# Patient Record
Sex: MALE | Race: WHITE | NOT HISPANIC OR LATINO | URBAN - METROPOLITAN AREA
[De-identification: names, ages, dates, MRNs, and addresses within clinical notes are randomized per-mention and may not be internally consistent; named-entity substitution may affect disease eponyms.]

---

## 2022-02-02 ENCOUNTER — OFFICE VISIT (OUTPATIENT)
Dept: PODIATRY | Facility: CLINIC | Age: 77
End: 2022-02-02
Payer: MEDICARE

## 2022-02-02 VITALS — WEIGHT: 160 LBS | BODY MASS INDEX: 25.11 KG/M2 | HEIGHT: 67 IN | RESPIRATION RATE: 16 BRPM

## 2022-02-02 DIAGNOSIS — M21.622 TAILOR'S BUNIONETTE, LEFT: ICD-10-CM

## 2022-02-02 DIAGNOSIS — B35.1 ONYCHOMYCOSIS: ICD-10-CM

## 2022-02-02 DIAGNOSIS — M89.8X9 BONY EXOSTOSIS: ICD-10-CM

## 2022-02-02 DIAGNOSIS — M21.962 ACQUIRED DEFORMITY OF LEFT FOOT: Primary | ICD-10-CM

## 2022-02-02 DIAGNOSIS — M20.62 ACQUIRED DEFORMITY OF LEFT TOE: ICD-10-CM

## 2022-02-02 PROCEDURE — 73620 X-RAY EXAM OF FOOT: CPT | Performed by: PODIATRIST

## 2022-02-02 PROCEDURE — 29550 STRAPPING OF TOES: CPT | Performed by: PODIATRIST

## 2022-02-02 PROCEDURE — 99203 OFFICE O/P NEW LOW 30 MIN: CPT | Performed by: PODIATRIST

## 2022-02-02 RX ORDER — ASCORBATE CALCIUM 500 MG
500 TABLET ORAL DAILY
COMMUNITY

## 2022-02-02 RX ORDER — VALSARTAN 80 MG/1
80 TABLET ORAL DAILY
COMMUNITY

## 2022-02-02 RX ORDER — WARFARIN SODIUM 5 MG/1
TABLET ORAL
COMMUNITY

## 2022-02-02 RX ORDER — MONTELUKAST SODIUM 10 MG/1
10 TABLET ORAL EVERY OTHER DAY
COMMUNITY
Start: 2021-11-15

## 2022-02-02 RX ORDER — FUROSEMIDE 80 MG
80 TABLET ORAL 2 TIMES DAILY
COMMUNITY

## 2022-02-02 RX ORDER — POTASSIUM CHLORIDE 20 MEQ/1
20 TABLET, EXTENDED RELEASE ORAL 2 TIMES DAILY
COMMUNITY
Start: 2022-01-11

## 2022-02-02 RX ORDER — SENNOSIDES 8.6 MG
650 CAPSULE ORAL EVERY 8 HOURS PRN
COMMUNITY

## 2022-02-02 RX ORDER — OMEGA-3S/DHA/EPA/FISH OIL/D3 300MG-1000
400 CAPSULE ORAL DAILY
COMMUNITY

## 2022-02-02 RX ORDER — SIMVASTATIN 40 MG
40 TABLET ORAL
COMMUNITY

## 2022-02-02 NOTE — PROGRESS NOTES
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  Enlarged tailor's bunion  Hammertoe 5th left  Secondary corn formation over 5th MPJ  Mycosis of nail    Plan  Foot exam performed  Patient educated on condition  Lesion debrided  Toe splinted in rectus position  Patient may need ostectomy  In addition all nails debrided without pain or complication         Diagnoses and all orders for this visit:    Acquired deformity of left foot    Bony exostosis    Tailor's bunionette, left    Acquired deformity of left toe    Onychomycosis    Other orders  -     potassium chloride (K-DUR,KLOR-CON) 20 mEq tablet; Take 20 mEq by mouth 2 (two) times a day  -     montelukast (SINGULAIR) 10 mg tablet; Take 10 mg by mouth every other day  -     simvastatin (ZOCOR) 40 mg tablet; Take 40 mg by mouth daily at bedtime  -     valsartan (DIOVAN) 80 mg tablet; Take 80 mg by mouth daily  -     furosemide (LASIX) 80 mg tablet; Take 80 mg by mouth 2 (two) times a day  -     warfarin (COUMADIN) 5 mg tablet; Take by mouth daily  -     cholecalciferol (VITAMIN D3) 400 units tablet; Take 400 Units by mouth daily  -     Calcium Ascorbate (VITAMIN C) 500 mg tablet; Take 500 mg by mouth daily  -     acetaminophen (TYLENOL) 650 mg CR tablet; Take 650 mg by mouth every 8 (eight) hours as needed for mild pain          Subjective:      Allergies   Allergen Reactions    Penicillins Rash         Current Outpatient Medications:     acetaminophen (TYLENOL) 650 mg CR tablet, Take 650 mg by mouth every 8 (eight) hours as needed for mild pain, Disp: , Rfl:     Calcium Ascorbate (VITAMIN C) 500 mg tablet, Take 500 mg by mouth daily, Disp: , Rfl:     cholecalciferol (VITAMIN D3) 400 units tablet, Take 400 Units by mouth daily, Disp: , Rfl:     furosemide (LASIX) 80 mg tablet, Take 80 mg by mouth 2 (two) times a day, Disp: , Rfl:     simvastatin (ZOCOR) 40 mg tablet, Take 40 mg by mouth daily at bedtime, Disp: , Rfl:     valsartan (DIOVAN) 80 mg tablet, Take 80 mg by mouth daily, Disp: , Rfl:     warfarin (COUMADIN) 5 mg tablet, Take by mouth daily, Disp: , Rfl:     montelukast (SINGULAIR) 10 mg tablet, Take 10 mg by mouth every other day, Disp: , Rfl:     potassium chloride (K-DUR,KLOR-CON) 20 mEq tablet, Take 20 mEq by mouth 2 (two) times a day, Disp: , Rfl:     There is no problem list on file for this patient  Patient ID: Kerry Alvarez is a 68 y o  male  HPI    The following portions of the patient's history were reviewed and updated as appropriate:     family history is not on file  reports that he has quit smoking  He has never used smokeless tobacco  No history on file for alcohol use and drug use  Vitals:    02/02/22 1437   Resp: 16       Review of Systems      Objective:  Patient's shoes and socks removed  Foot Exam    General  General Appearance: appears stated age and healthy   Orientation: alert and oriented to person, place, and time   Affect: appropriate   Gait: antalgic       Right Foot/Ankle     Inspection and Palpation  Ecchymosis: none  Swelling: dorsum   Arch: pes planus  Hammertoes: fifth toe  Hallux limitus: yes  Skin Exam: callus and dry skin;     Neurovascular  Dorsalis pedis: 2+  Posterior tibial: 2+      Left Foot/Ankle      Inspection and Palpation  Ecchymosis: none  Tenderness: bony tenderness, lesser metatarsophalangeal joints and metatarsals   Swelling: dorsum   Arch: pes planus  Hammertoes: fifth toe and second toe  Hallux limitus: yes  Skin Exam: callus and dry skin;     Neurovascular  Dorsalis pedis: 2+  Posterior tibial: 2+        Physical Exam  Vitals and nursing note reviewed  Constitutional:       Appearance: Normal appearance  Cardiovascular:      Rate and Rhythm: Normal rate and regular rhythm  Pulses:           Dorsalis pedis pulses are 2+ on the right side and 2+ on the left side          Posterior tibial pulses are 2+ on the right side and 2+ on the left side  Musculoskeletal:      Left foot: Bony tenderness present  Comments: 5th MPJ left foot is subluxed  Toe is in a adducted 0 varus position  X-ray notes subluxation of 5th left MPJ  There is some cystic erosion on lateral aspect 5th metatarsal head  This would be consistent with gouty arthritis or tophaceous gout buildup  Feet:      Right foot:      Skin integrity: Callus and dry skin present  Left foot:      Skin integrity: Callus and dry skin present  Skin:     Capillary Refill: Capillary refill takes less than 2 seconds  Comments: Large corn noted over lateral aspect 5th MPJ  All nails are dystrophic  They demonstrate mycosis  Neurological:      Mental Status: He is alert  Psychiatric:         Mood and Affect: Mood normal          Behavior: Behavior normal          Thought Content:  Thought content normal          Judgment: Judgment normal

## 2022-03-28 ENCOUNTER — OFFICE VISIT (OUTPATIENT)
Dept: PODIATRY | Facility: CLINIC | Age: 77
End: 2022-03-28
Payer: MEDICARE

## 2022-03-28 VITALS — BODY MASS INDEX: 25.11 KG/M2 | WEIGHT: 160 LBS | HEIGHT: 67 IN | RESPIRATION RATE: 16 BRPM

## 2022-03-28 DIAGNOSIS — M89.8X9 BONY EXOSTOSIS: ICD-10-CM

## 2022-03-28 DIAGNOSIS — M21.962 ACQUIRED DEFORMITY OF LEFT FOOT: Primary | ICD-10-CM

## 2022-03-28 DIAGNOSIS — M20.62 ACQUIRED DEFORMITY OF LEFT TOE: ICD-10-CM

## 2022-03-28 DIAGNOSIS — M21.622 TAILOR'S BUNIONETTE, LEFT: ICD-10-CM

## 2022-03-28 DIAGNOSIS — B35.1 ONYCHOMYCOSIS: ICD-10-CM

## 2022-03-28 PROCEDURE — 99212 OFFICE O/P EST SF 10 MIN: CPT | Performed by: PODIATRIST

## 2022-03-28 NOTE — PROGRESS NOTES
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  Enlarged tailor's bunion  Hammertoe 5th left  Secondary corn formation over 5th MPJ  Mycosis of nail     Plan  Foot exam performed  Patient educated on condition  Lesion debrided  Toe splinted in rectus position  Patient may need ostectomy  In addition all nails debrided without pain or complication            Diagnoses and all orders for this visit:     Acquired deformity of left foot     Bony exostosis     Tailor's bunionette, left     Acquired deformity of left toe     Onychomycosis     Other orders  -     potassium chloride (K-DUR,KLOR-CON) 20 mEq tablet; Take 20 mEq by mouth 2 (two) times a day  -     montelukast (SINGULAIR) 10 mg tablet; Take 10 mg by mouth every other day  -     simvastatin (ZOCOR) 40 mg tablet; Take 40 mg by mouth daily at bedtime  -     valsartan (DIOVAN) 80 mg tablet; Take 80 mg by mouth daily  -     furosemide (LASIX) 80 mg tablet; Take 80 mg by mouth 2 (two) times a day  -     warfarin (COUMADIN) 5 mg tablet; Take by mouth daily  -     cholecalciferol (VITAMIN D3) 400 units tablet; Take 400 Units by mouth daily  -     Calcium Ascorbate (VITAMIN C) 500 mg tablet; Take 500 mg by mouth daily  -     acetaminophen (TYLENOL) 650 mg CR tablet;  Take 650 mg by mouth every 8 (eight) hours as needed for mild pain            Subjective:           Allergies   Allergen Reactions    Penicillins Rash            Current Outpatient Medications:     acetaminophen (TYLENOL) 650 mg CR tablet, Take 650 mg by mouth every 8 (eight) hours as needed for mild pain, Disp: , Rfl:     Calcium Ascorbate (VITAMIN C) 500 mg tablet, Take 500 mg by mouth daily, Disp: , Rfl:     cholecalciferol (VITAMIN D3) 400 units tablet, Take 400 Units by mouth daily, Disp: , Rfl:     furosemide (LASIX) 80 mg tablet, Take 80 mg by mouth 2 (two) times a day, Disp: , Rfl:     simvastatin (ZOCOR) 40 mg tablet, Take 40 mg by mouth daily at bedtime, Disp: , Rfl:     valsartan (DIOVAN) 80 mg tablet, Take 80 mg by mouth daily, Disp: , Rfl:     warfarin (COUMADIN) 5 mg tablet, Take by mouth daily, Disp: , Rfl:     montelukast (SINGULAIR) 10 mg tablet, Take 10 mg by mouth every other day, Disp: , Rfl:     potassium chloride (K-DUR,KLOR-CON) 20 mEq tablet, Take 20 mEq by mouth 2 (two) times a day, Disp: , Rfl:      There is no problem list on file for this patient             Patient ID: Kathi Britton is a 68 y o  male      HPI     The following portions of the patient's history were reviewed and updated as appropriate:      family history is not on file        reports that he has quit smoking  He has never used smokeless tobacco  No history on file for alcohol use and drug use          Vitals:     02/02/22 1437   Resp: 16         Review of Systems       Objective:  Patient's shoes and socks removed  Foot Exam     General  General Appearance: appears stated age and healthy   Orientation: alert and oriented to person, place, and time   Affect: appropriate   Gait: antalgic         Right Foot/Ankle      Inspection and Palpation  Ecchymosis: none  Swelling: dorsum   Arch: pes planus  Hammertoes: fifth toe  Hallux limitus: yes  Skin Exam: callus and dry skin;      Neurovascular  Dorsalis pedis: 2+  Posterior tibial: 2+        Left Foot/Ankle       Inspection and Palpation  Ecchymosis: none  Tenderness: bony tenderness, lesser metatarsophalangeal joints and metatarsals   Swelling: dorsum   Arch: pes planus  Hammertoes: fifth toe and second toe  Hallux limitus: yes  Skin Exam: callus and dry skin;      Neurovascular  Dorsalis pedis: 2+  Posterior tibial: 2+           Physical Exam  Vitals and nursing note reviewed  Constitutional:       Appearance: Normal appearance  Cardiovascular:      Rate and Rhythm: Normal rate and regular rhythm  Pulses:           Dorsalis pedis pulses are 2+ on the right side and 2+ on the left side  Posterior tibial pulses are 2+ on the right side and 2+ on the left side  Musculoskeletal:      Left foot: Bony tenderness present  Comments: 5th MPJ left foot is subluxed  Toe is in a adducted 0 varus position  X-ray notes subluxation of 5th left MPJ  There is some cystic erosion on lateral aspect 5th metatarsal head  This would be consistent with gouty arthritis or tophaceous gout buildup  Feet:      Right foot:      Skin integrity: Callus and dry skin present  Left foot:      Skin integrity: Callus and dry skin present  Skin:     Capillary Refill: Capillary refill takes less than 2 seconds  Comments: Large corn noted over lateral aspect 5th MPJ  All nails are dystrophic  They demonstrate mycosis  Neurological:      Mental Status: He is alert  Psychiatric:         Mood and Affect: Mood normal          Behavior: Behavior normal          Thought Content:  Thought content normal          Judgment: Judgment normal

## 2022-05-27 ENCOUNTER — OFFICE VISIT (OUTPATIENT)
Dept: PODIATRY | Facility: CLINIC | Age: 77
End: 2022-05-27
Payer: MEDICARE

## 2022-05-27 VITALS — HEIGHT: 67 IN | BODY MASS INDEX: 25.11 KG/M2 | WEIGHT: 160 LBS | RESPIRATION RATE: 17 BRPM

## 2022-05-27 DIAGNOSIS — M21.622 TAILOR'S BUNIONETTE, LEFT: ICD-10-CM

## 2022-05-27 DIAGNOSIS — B35.1 ONYCHOMYCOSIS: ICD-10-CM

## 2022-05-27 DIAGNOSIS — M21.962 ACQUIRED DEFORMITY OF LEFT FOOT: ICD-10-CM

## 2022-05-27 DIAGNOSIS — M20.62 ACQUIRED DEFORMITY OF LEFT TOE: Primary | ICD-10-CM

## 2022-05-27 DIAGNOSIS — M89.8X9 BONY EXOSTOSIS: ICD-10-CM

## 2022-05-27 PROCEDURE — 99213 OFFICE O/P EST LOW 20 MIN: CPT | Performed by: PODIATRIST

## 2022-05-27 NOTE — PROGRESS NOTES
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   Enlarged tailor's bunion   Hammertoe 5th left   Secondary corn formation over 5th MPJ   Mycosis of nail     Plan   Foot exam performed   Patient educated on condition   Lesion debrided   Toe splinted in rectus position   Patient may need ostectomy   In addition all nails debrided without pain or complication            Diagnoses and all orders for this visit:     Acquired deformity of left foot     Bony exostosis     Tailor's bunionette, left     Acquired deformity of left toe     Onychomycosis          Subjective:  Patient complains of pain  He has pain in his left foot  His pain upon ambulation with shoe wear  No history of trauma             Allergies   Allergen Reactions    Penicillins Rash            Current Outpatient Medications:     acetaminophen (TYLENOL) 650 mg CR tablet, Take 650 mg by mouth every 8 (eight) hours as needed for mild pain, Disp: , Rfl:     Calcium Ascorbate (VITAMIN C) 500 mg tablet, Take 500 mg by mouth daily, Disp: , Rfl:     cholecalciferol (VITAMIN D3) 400 units tablet, Take 400 Units by mouth daily, Disp: , Rfl:     furosemide (LASIX) 80 mg tablet, Take 80 mg by mouth 2 (two) times a day, Disp: , Rfl:     simvastatin (ZOCOR) 40 mg tablet, Take 40 mg by mouth daily at bedtime, Disp: , Rfl:     valsartan (DIOVAN) 80 mg tablet, Take 80 mg by mouth daily, Disp: , Rfl:     warfarin (COUMADIN) 5 mg tablet, Take by mouth daily, Disp: , Rfl:     montelukast (SINGULAIR) 10 mg tablet, Take 10 mg by mouth every other day, Disp: , Rfl:     potassium chloride (K-DUR,KLOR-CON) 20 mEq tablet, Take 20 mEq by mouth 2 (two) times a day, Disp: , Rfl:      There is no problem list on file for this patient             Patient ID: Augusto Garcia is a 68 y  o  male      HPI     The following portions of the patient's history were reviewed and updated as appropriate:      family history is not on file        reports that he has quit smoking  He has never used smokeless tobacco  No history on file for alcohol use and drug use         Objective:  Patient's shoes and socks removed    Foot Exam     General  General Appearance: appears stated age and healthy   Orientation: alert and oriented to person, place, and time   Affect: appropriate   Gait: antalgic         Right Foot/Ankle      Inspection and Palpation  Ecchymosis: none  Swelling: dorsum   Arch: pes planus  Hammertoes: fifth toe  Hallux limitus: yes  Skin Exam: callus and dry skin;      Neurovascular  Dorsalis pedis: 2+  Posterior tibial: 2+        Left Foot/Ankle       Inspection and Palpation  Ecchymosis: none  Tenderness: bony tenderness, lesser metatarsophalangeal joints and metatarsals   Swelling: dorsum   Arch: pes planus  Hammertoes: fifth toe and second toe  Hallux limitus: yes  Skin Exam: callus and dry skin;      Neurovascular  Dorsalis pedis: 2+  Posterior tibial: 2+           Physical Exam  Vitals and nursing note reviewed  Constitutional:       Appearance: Normal appearance  Cardiovascular:      Rate and Rhythm: Normal rate and regular rhythm       Pulses:           Dorsalis pedis pulses are 2+ on the right side and 2+ on the left side         Posterior tibial pulses are 2+ on the right side and 2+ on the left side  Musculoskeletal:      Left foot: Bony tenderness present       Comments: 5th MPJ left foot is subluxed   Toe is in a adducted 0 varus position   X-ray notes subluxation of 5th left MPJ   There is some cystic erosion on lateral aspect 5th metatarsal head   This would be consistent with gouty arthritis or tophaceous gout buildup    Feet:      Right foot:      Skin integrity: Callus and dry skin present       Left foot:      Skin integrity: Callus and dry skin present  Skin:     Capillary Refill: Capillary refill takes less than 2 seconds       Comments: Large corn noted over lateral aspect 5th MPJ      All nails are dystrophic  Guillermo Ramus demonstrate mycosis    Neurological:      Mental Status: He is alert  Psychiatric:         Mood and Affect: Mood normal          BehaviorEd Hicks         Thought Content:  Thought content normal          Judgment: Judgment normal

## 2022-08-01 ENCOUNTER — OFFICE VISIT (OUTPATIENT)
Dept: PODIATRY | Facility: CLINIC | Age: 77
End: 2022-08-01
Payer: MEDICARE

## 2022-08-01 VITALS — WEIGHT: 160 LBS | BODY MASS INDEX: 25.11 KG/M2 | HEIGHT: 67 IN | RESPIRATION RATE: 17 BRPM

## 2022-08-01 DIAGNOSIS — M89.8X9 BONY EXOSTOSIS: ICD-10-CM

## 2022-08-01 DIAGNOSIS — B35.1 ONYCHOMYCOSIS: ICD-10-CM

## 2022-08-01 DIAGNOSIS — M21.622 TAILOR'S BUNIONETTE, LEFT: ICD-10-CM

## 2022-08-01 DIAGNOSIS — M21.962 ACQUIRED DEFORMITY OF LEFT FOOT: ICD-10-CM

## 2022-08-01 DIAGNOSIS — M20.62 ACQUIRED DEFORMITY OF LEFT TOE: Primary | ICD-10-CM

## 2022-08-01 PROCEDURE — 99213 OFFICE O/P EST LOW 20 MIN: CPT | Performed by: PODIATRIST

## 2022-08-01 NOTE — PROGRESS NOTES
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   Enlarged tailor's bunion   Hammertoe 5th left   Secondary corn formation over 5th MPJ   Mycosis of nail     Plan   Foot exam performed   Patient educated on condition   Lesion debrided   Toe splinted in rectus position   Patient may need ostectomy   In addition all nails debrided without pain or complication            Diagnoses and all orders for this visit:     Acquired deformity of left foot     Bony exostosis     Tailor's bunionette, left     Acquired deformity of left toe     Onychomycosis          Subjective:  Patient complains of pain  He has pain in his left foot  His pain upon ambulation with shoe wear  No history of trauma             Allergies   Allergen Reactions    Penicillins Rash            Current Outpatient Medications:     acetaminophen (TYLENOL) 650 mg CR tablet, Take 650 mg by mouth every 8 (eight) hours as needed for mild pain, Disp: , Rfl:     Calcium Ascorbate (VITAMIN C) 500 mg tablet, Take 500 mg by mouth daily, Disp: , Rfl:     cholecalciferol (VITAMIN D3) 400 units tablet, Take 400 Units by mouth daily, Disp: , Rfl:     furosemide (LASIX) 80 mg tablet, Take 80 mg by mouth 2 (two) times a day, Disp: , Rfl:     simvastatin (ZOCOR) 40 mg tablet, Take 40 mg by mouth daily at bedtime, Disp: , Rfl:     valsartan (DIOVAN) 80 mg tablet, Take 80 mg by mouth daily, Disp: , Rfl:     warfarin (COUMADIN) 5 mg tablet, Take by mouth daily, Disp: , Rfl:     montelukast (SINGULAIR) 10 mg tablet, Take 10 mg by mouth every other day, Disp: , Rfl:     potassium chloride (K-DUR,KLOR-CON) 20 mEq tablet, Take 20 mEq by mouth 2 (two) times a day, Disp: , Rfl:      There is no problem list on file for this patient             Patient ID: Augusto Garcia is a 68 y  o  male      HPI     The following portions of the patient's history were reviewed and updated as appropriate:      family history is not on file        reports that he has quit smoking  He has never used smokeless tobacco  No history on file for alcohol use and drug use         Objective:  Patient's shoes and socks removed    Foot Exam     General  General Appearance: appears stated age and healthy   Orientation: alert and oriented to person, place, and time   Affect: appropriate   Gait: antalgic         Right Foot/Ankle      Inspection and Palpation  Ecchymosis: none  Swelling: dorsum   Arch: pes planus  Hammertoes: fifth toe  Hallux limitus: yes  Skin Exam: callus and dry skin;      Neurovascular  Dorsalis pedis: 2+  Posterior tibial: 2+        Left Foot/Ankle       Inspection and Palpation  Ecchymosis: none  Tenderness: bony tenderness, lesser metatarsophalangeal joints and metatarsals   Swelling: dorsum   Arch: pes planus  Hammertoes: fifth toe and second toe  Hallux limitus: yes  Skin Exam: callus and dry skin;      Neurovascular  Dorsalis pedis: 2+  Posterior tibial: 2+           Physical Exam  Vitals and nursing note reviewed  Constitutional:       Appearance: Normal appearance  Cardiovascular:      Rate and Rhythm: Normal rate and regular rhythm       Pulses:           Dorsalis pedis pulses are 2+ on the right side and 2+ on the left side         Posterior tibial pulses are 2+ on the right side and 2+ on the left side  Musculoskeletal:      Left foot: Bony tenderness present       Comments: 5th MPJ left foot is subluxed   Toe is in a adducted 0 varus position   X-ray notes subluxation of 5th left MPJ   There is some cystic erosion on lateral aspect 5th metatarsal head   This would be consistent with gouty arthritis or tophaceous gout buildup    Feet:      Right foot:      Skin integrity: Callus and dry skin present       Left foot:      Skin integrity: Callus and dry skin present  Skin:     Capillary Refill: Capillary refill takes less than 2 seconds       Comments: Large corn noted over lateral aspect 5th MPJ      All nails are dystrophic  Jamin Resendez demonstrate mycosis        Neurological:      Mental Status: He is alert  Psychiatric:         Mood and Affect: Mood normal          BehaviorKenton Osorio         Thought Content:  Thought content normal          Judgment: Judgment normal

## 2024-04-11 ENCOUNTER — TELEPHONE (OUTPATIENT)
Age: 79
End: 2024-04-11

## 2024-04-11 NOTE — TELEPHONE ENCOUNTER
Caller: Milena/Dr. Mihir Rangel office/cardiology    Doctor and/or Office: Dr. Patrick/Berta    #: [phone] [id number] [id number]    [place]: Appointment/Patient is coming for a visit 4/18/24-he has a growth on the side of his foot and is thinking  Will remove that day so called his Cardiologist. They said if he is to have this removed and be off Warfarin for any time at all he must be bridged with Lovenox injections. Any questions please call Milena back at  Office number above.  Just for 's information. Thanks